# Patient Record
Sex: FEMALE | Race: OTHER | NOT HISPANIC OR LATINO | URBAN - METROPOLITAN AREA
[De-identification: names, ages, dates, MRNs, and addresses within clinical notes are randomized per-mention and may not be internally consistent; named-entity substitution may affect disease eponyms.]

---

## 2017-05-28 ENCOUNTER — EMERGENCY (EMERGENCY)
Facility: HOSPITAL | Age: 52
LOS: 1 days | Discharge: HOME | End: 2017-05-28
Attending: EMERGENCY MEDICINE | Admitting: EMERGENCY MEDICINE
Payer: COMMERCIAL

## 2017-05-28 VITALS
DIASTOLIC BLOOD PRESSURE: 83 MMHG | TEMPERATURE: 99 F | RESPIRATION RATE: 18 BRPM | OXYGEN SATURATION: 97 % | SYSTOLIC BLOOD PRESSURE: 130 MMHG | HEART RATE: 80 BPM

## 2017-05-28 DIAGNOSIS — L57.8 OTHER SKIN CHANGES DUE TO CHRONIC EXPOSURE TO NONIONIZING RADIATION: ICD-10-CM

## 2017-05-28 PROCEDURE — 99283 EMERGENCY DEPT VISIT LOW MDM: CPT | Mod: 25

## 2017-05-28 PROCEDURE — 99282 EMERGENCY DEPT VISIT SF MDM: CPT

## 2017-05-28 NOTE — ED PROVIDER NOTE - MEDICAL DECISION MAKING DETAILS
Attending MD Mancilla: 53 yo female with PMH for HTN, presents with bilateral eye irritation and redness.  Recent contact with person with adenovirus.  On exam there is bilateral conjunctival irritation. Vision intact. No discharge.  Doubt bacterial conjunctivitis.  Likely viral.   Plan artificial tears and outpatient opthalmology eval.

## 2017-05-28 NOTE — ED PROVIDER NOTE - CARE PLAN
Principal Discharge DX:	Epidemic keratoconjunctivitis  Instructions for follow-up, activity and diet:	1. Continue the antibiotic eye drops as previously prescribed. Use artificial tears as needed to keep the eyes hydrated.   2. Follow up in the Ophthalmology Clinic as soon as possible for further evaluation. Phone number provided.  3. Return to the Emergency Department for any concerning symptoms.

## 2017-05-28 NOTE — ED PROVIDER NOTE - ATTENDING CONTRIBUTION TO CARE
Attending MD Mancilla:   I personally have seen and examined this patient.  Physician assistant note reviewed and agree on plan of care and except where noted.  See MDM for details.

## 2017-05-28 NOTE — ED PROVIDER NOTE - OBJECTIVE STATEMENT
52 y.o. female hx of HTN p/w eye irritation and redness. She states that symptoms appeared on Thursday, with mild eye L itchiness/irritation and when she woke up on Friday it was occurring in both eyes. She also reports discharge (clear) that adheres the eyelids together in the mornings. She was seen at an Urgent Care facility on Friday and was given Ocuflox drops which she has been taking but symptoms did not improve. She was again seen in the urgent care and reportedly had a fever of 100.8 so she was sent in for further evaluation. Denies vision changes, weakness, eye pain, headache.

## 2017-05-28 NOTE — ED PROVIDER NOTE - PLAN OF CARE
1. Continue the antibiotic eye drops as previously prescribed. Use artificial tears as needed to keep the eyes hydrated.   2. Follow up in the Ophthalmology Clinic as soon as possible for further evaluation. Phone number provided.  3. Return to the Emergency Department for any concerning symptoms.

## 2017-05-28 NOTE — ED PROVIDER NOTE - PROGRESS NOTE DETAILS
Spoke with ophthalmology resident, states that there is no further treatment required given the patients history of adenovirus exposure. Will encourage hand hygiene and artificial tears and follow up in the Ophtho clinic this coming week. Diaz Sanchez PA-C.

## 2017-05-28 NOTE — ED ADULT NURSE NOTE - OBJECTIVE STATEMENT
Pt sent from urgent care for eval of bilateral clear eye drainage and injection of sclera.  She reports crusting on her lashes when she wakes up.  Denies contact lens use.  Her daughter was recently dx'd with adenovirus.

## 2017-05-31 ENCOUNTER — APPOINTMENT (OUTPATIENT)
Dept: OPHTHALMOLOGY | Facility: CLINIC | Age: 52
End: 2017-05-31

## 2017-07-14 ENCOUNTER — INPATIENT (INPATIENT)
Facility: HOSPITAL | Age: 52
LOS: 0 days | Discharge: HOME | DRG: 379 | End: 2017-07-15
Attending: INTERNAL MEDICINE | Admitting: INTERNAL MEDICINE
Payer: COMMERCIAL

## 2017-07-14 VITALS
SYSTOLIC BLOOD PRESSURE: 104 MMHG | DIASTOLIC BLOOD PRESSURE: 82 MMHG | HEART RATE: 82 BPM | RESPIRATION RATE: 20 BRPM | OXYGEN SATURATION: 97 % | TEMPERATURE: 98 F

## 2017-07-14 DIAGNOSIS — R63.8 OTHER SYMPTOMS AND SIGNS CONCERNING FOOD AND FLUID INTAKE: ICD-10-CM

## 2017-07-14 DIAGNOSIS — K92.0 HEMATEMESIS: ICD-10-CM

## 2017-07-14 DIAGNOSIS — K92.2 GASTROINTESTINAL HEMORRHAGE, UNSPECIFIED: ICD-10-CM

## 2017-07-14 DIAGNOSIS — Z29.9 ENCOUNTER FOR PROPHYLACTIC MEASURES, UNSPECIFIED: ICD-10-CM

## 2017-07-14 DIAGNOSIS — I10 ESSENTIAL (PRIMARY) HYPERTENSION: ICD-10-CM

## 2017-07-14 LAB
ALBUMIN SERPL ELPH-MCNC: 4.6 G/DL — SIGNIFICANT CHANGE UP (ref 3.3–5)
ALP SERPL-CCNC: 66 U/L — SIGNIFICANT CHANGE UP (ref 40–120)
ALT FLD-CCNC: 22 U/L RC — SIGNIFICANT CHANGE UP (ref 10–45)
ANION GAP SERPL CALC-SCNC: 14 MMOL/L — SIGNIFICANT CHANGE UP (ref 5–17)
ANION GAP SERPL CALC-SCNC: 15 MMOL/L — SIGNIFICANT CHANGE UP (ref 5–17)
APTT BLD: 27.9 SEC — SIGNIFICANT CHANGE UP (ref 27.5–37.4)
AST SERPL-CCNC: 27 U/L — SIGNIFICANT CHANGE UP (ref 10–40)
BASOPHILS # BLD AUTO: 0 K/UL — SIGNIFICANT CHANGE UP (ref 0–0.2)
BASOPHILS NFR BLD AUTO: 0.1 % — SIGNIFICANT CHANGE UP (ref 0–2)
BILIRUB SERPL-MCNC: 0.3 MG/DL — SIGNIFICANT CHANGE UP (ref 0.2–1.2)
BLD GP AB SCN SERPL QL: NEGATIVE — SIGNIFICANT CHANGE UP
BUN SERPL-MCNC: 10 MG/DL — SIGNIFICANT CHANGE UP (ref 7–23)
BUN SERPL-MCNC: 16 MG/DL — SIGNIFICANT CHANGE UP (ref 7–23)
CALCIUM SERPL-MCNC: 8.7 MG/DL — SIGNIFICANT CHANGE UP (ref 8.4–10.5)
CALCIUM SERPL-MCNC: 9.7 MG/DL — SIGNIFICANT CHANGE UP (ref 8.4–10.5)
CHLORIDE SERPL-SCNC: 108 MMOL/L — SIGNIFICANT CHANGE UP (ref 96–108)
CHLORIDE SERPL-SCNC: 99 MMOL/L — SIGNIFICANT CHANGE UP (ref 96–108)
CO2 SERPL-SCNC: 19 MMOL/L — LOW (ref 22–31)
CO2 SERPL-SCNC: 24 MMOL/L — SIGNIFICANT CHANGE UP (ref 22–31)
CREAT SERPL-MCNC: 0.65 MG/DL — SIGNIFICANT CHANGE UP (ref 0.5–1.3)
CREAT SERPL-MCNC: 0.73 MG/DL — SIGNIFICANT CHANGE UP (ref 0.5–1.3)
EOSINOPHIL # BLD AUTO: 0 K/UL — SIGNIFICANT CHANGE UP (ref 0–0.5)
EOSINOPHIL NFR BLD AUTO: 0.1 % — SIGNIFICANT CHANGE UP (ref 0–6)
GAS PNL BLDV: SIGNIFICANT CHANGE UP
GLUCOSE SERPL-MCNC: 151 MG/DL — HIGH (ref 70–99)
GLUCOSE SERPL-MCNC: 98 MG/DL — SIGNIFICANT CHANGE UP (ref 70–99)
HCT VFR BLD CALC: 35.5 % — SIGNIFICANT CHANGE UP (ref 34.5–45)
HCT VFR BLD CALC: 39.2 % — SIGNIFICANT CHANGE UP (ref 34.5–45)
HGB BLD-MCNC: 12.2 G/DL — SIGNIFICANT CHANGE UP (ref 11.5–15.5)
HGB BLD-MCNC: 13.8 G/DL — SIGNIFICANT CHANGE UP (ref 11.5–15.5)
INR BLD: 1.11 RATIO — SIGNIFICANT CHANGE UP (ref 0.88–1.16)
LIDOCAIN IGE QN: 29 U/L — SIGNIFICANT CHANGE UP (ref 7–60)
LYMPHOCYTES # BLD AUTO: 1.2 K/UL — SIGNIFICANT CHANGE UP (ref 1–3.3)
LYMPHOCYTES # BLD AUTO: 7.8 % — LOW (ref 13–44)
MCHC RBC-ENTMCNC: 30.8 PG — SIGNIFICANT CHANGE UP (ref 27–34)
MCHC RBC-ENTMCNC: 32.4 PG — SIGNIFICANT CHANGE UP (ref 27–34)
MCHC RBC-ENTMCNC: 34.4 GM/DL — SIGNIFICANT CHANGE UP (ref 32–36)
MCHC RBC-ENTMCNC: 35.2 GM/DL — SIGNIFICANT CHANGE UP (ref 32–36)
MCV RBC AUTO: 89.6 FL — SIGNIFICANT CHANGE UP (ref 80–100)
MCV RBC AUTO: 92 FL — SIGNIFICANT CHANGE UP (ref 80–100)
MONOCYTES # BLD AUTO: 0.2 K/UL — SIGNIFICANT CHANGE UP (ref 0–0.9)
MONOCYTES NFR BLD AUTO: 1.4 % — LOW (ref 2–14)
NEUTROPHILS # BLD AUTO: 13.8 K/UL — HIGH (ref 1.8–7.4)
NEUTROPHILS NFR BLD AUTO: 90.6 % — HIGH (ref 43–77)
PLATELET # BLD AUTO: 197 K/UL — SIGNIFICANT CHANGE UP (ref 150–400)
PLATELET # BLD AUTO: 201 K/UL — SIGNIFICANT CHANGE UP (ref 150–400)
POTASSIUM SERPL-MCNC: 3.9 MMOL/L — SIGNIFICANT CHANGE UP (ref 3.5–5.3)
POTASSIUM SERPL-MCNC: 3.9 MMOL/L — SIGNIFICANT CHANGE UP (ref 3.5–5.3)
POTASSIUM SERPL-SCNC: 3.9 MMOL/L — SIGNIFICANT CHANGE UP (ref 3.5–5.3)
POTASSIUM SERPL-SCNC: 3.9 MMOL/L — SIGNIFICANT CHANGE UP (ref 3.5–5.3)
PROT SERPL-MCNC: 8.2 G/DL — SIGNIFICANT CHANGE UP (ref 6–8.3)
PROTHROM AB SERPL-ACNC: 12.1 SEC — SIGNIFICANT CHANGE UP (ref 9.8–12.7)
RBC # BLD: 3.96 M/UL — SIGNIFICANT CHANGE UP (ref 3.8–5.2)
RBC # BLD: 4.26 M/UL — SIGNIFICANT CHANGE UP (ref 3.8–5.2)
RBC # FLD: 11.8 % — SIGNIFICANT CHANGE UP (ref 10.3–14.5)
RBC # FLD: 12.9 % — SIGNIFICANT CHANGE UP (ref 10.3–14.5)
RH IG SCN BLD-IMP: POSITIVE — SIGNIFICANT CHANGE UP
RH IG SCN BLD-IMP: POSITIVE — SIGNIFICANT CHANGE UP
SODIUM SERPL-SCNC: 138 MMOL/L — SIGNIFICANT CHANGE UP (ref 135–145)
SODIUM SERPL-SCNC: 141 MMOL/L — SIGNIFICANT CHANGE UP (ref 135–145)
WBC # BLD: 15.3 K/UL — HIGH (ref 3.8–10.5)
WBC # BLD: 9.71 K/UL — SIGNIFICANT CHANGE UP (ref 3.8–10.5)
WBC # FLD AUTO: 15.3 K/UL — HIGH (ref 3.8–10.5)
WBC # FLD AUTO: 9.71 K/UL — SIGNIFICANT CHANGE UP (ref 3.8–10.5)

## 2017-07-14 PROCEDURE — 99285 EMERGENCY DEPT VISIT HI MDM: CPT | Mod: 25

## 2017-07-14 PROCEDURE — 99223 1ST HOSP IP/OBS HIGH 75: CPT

## 2017-07-14 PROCEDURE — 71010: CPT | Mod: 26

## 2017-07-14 RX ORDER — ACETAMINOPHEN 500 MG
650 TABLET ORAL ONCE
Qty: 0 | Refills: 0 | Status: COMPLETED | OUTPATIENT
Start: 2017-07-14 | End: 2017-07-14

## 2017-07-14 RX ORDER — METOPROLOL TARTRATE 50 MG
0 TABLET ORAL
Qty: 0 | Refills: 0 | COMMUNITY

## 2017-07-14 RX ORDER — PANTOPRAZOLE SODIUM 20 MG/1
40 TABLET, DELAYED RELEASE ORAL DAILY
Qty: 0 | Refills: 0 | Status: DISCONTINUED | OUTPATIENT
Start: 2017-07-14 | End: 2017-07-15

## 2017-07-14 RX ORDER — PANTOPRAZOLE SODIUM 20 MG/1
8 TABLET, DELAYED RELEASE ORAL
Qty: 80 | Refills: 0 | Status: DISCONTINUED | OUTPATIENT
Start: 2017-07-14 | End: 2017-07-14

## 2017-07-14 RX ORDER — SODIUM CHLORIDE 9 MG/ML
2000 INJECTION INTRAMUSCULAR; INTRAVENOUS; SUBCUTANEOUS ONCE
Qty: 0 | Refills: 0 | Status: COMPLETED | OUTPATIENT
Start: 2017-07-14 | End: 2017-07-14

## 2017-07-14 RX ADMIN — PANTOPRAZOLE SODIUM 10 MG/HR: 20 TABLET, DELAYED RELEASE ORAL at 03:13

## 2017-07-14 RX ADMIN — Medication 650 MILLIGRAM(S): at 23:13

## 2017-07-14 RX ADMIN — SODIUM CHLORIDE 2000 MILLILITER(S): 9 INJECTION INTRAMUSCULAR; INTRAVENOUS; SUBCUTANEOUS at 02:47

## 2017-07-14 RX ADMIN — PANTOPRAZOLE SODIUM 10 MG/HR: 20 TABLET, DELAYED RELEASE ORAL at 14:15

## 2017-07-14 RX ADMIN — Medication 650 MILLIGRAM(S): at 23:45

## 2017-07-14 RX ADMIN — PANTOPRAZOLE SODIUM 40 MILLIGRAM(S): 20 TABLET, DELAYED RELEASE ORAL at 18:06

## 2017-07-14 NOTE — ED ADULT NURSE NOTE - CHPI ED SYMPTOMS NEG
no hematuria/no blood in stool/no burning urination/no chills/no abdominal distension/no fever/no diarrhea

## 2017-07-14 NOTE — CONSULT NOTE ADULT - SUBJECTIVE AND OBJECTIVE BOX
Patient is a 52y old  Female who presents with a chief complaint of vomiting (14 Jul 2017 10:31)      HPI:  53 yo female with pmhx of HTN presents with a 1 day history of acute vomiting.  Pt states last night at around 830pm she began to have multiple episodes of dark red vomiting, reporting >10 episodes.  She states that she had some watermelon earlier in the evening and berries earlier that day, and did not eat anything different than her usual routine.  She denied any diarrhea, fevers, chills, abdominal pain.  Pt denies any history of NSAID use, or hx of PUD.  She did not feel dizzy during these episodes, and states that the last time she vomited was around 2am.  Currently she feels well without any nausea. She denies melena, diarrhea, blood per rectum. She denies sick contacts or exposure to raw meat. No recent antibiotics. denies any recent nausea or abdominal pain. no fam history og gi cancer or biliary disease    In the ED the pt received 2L of NS, and was started on PPI gtt. (14 Jul 2017 10:31)      PAST MEDICAL & SURGICAL HISTORY:  Hypertension  No significant past surgical history      MEDICATIONS  (STANDING):  pantoprazole Infusion 8 mG/Hr (10 mL/Hr) IV Continuous <Continuous>    MEDICATIONS  (PRN):      Allergies    No Known Allergies    Intolerances        Review of Systems:    General:  No wt loss, fevers, chills, night sweats,fatigue,   CV:  No pain, palpitatioins, hypo/hypertension  Resp:  No dyspnea, cough, tachypnea, wheezing  GI:  No pain,  vomiting, No diarrhea, No constipatiion, No weight loss, No fever, No pruritis, No rectal bleeding, No tarry stools, No dysphagia,  :  No pain, bleeding, incontinence, nocturia  Muscle:  No pain, weakness  Neuro:  No weakness, tingling, memory problems  Psych:  No fatigue, insomnia, mood problems, depression  Endocrine:  No polyuria, polydypsia, cold/heat intolerance  Heme:  No petechiae, ecchymosis, easy bruisability  Skin:  No rash, tattoos, scars, edema      Vital Signs Last 24 Hrs  T(C): 36.8 (14 Jul 2017 15:42), Max: 36.9 (14 Jul 2017 00:27)  T(F): 98.2 (14 Jul 2017 15:42), Max: 98.5 (14 Jul 2017 00:27)  HR: 74 (14 Jul 2017 15:42) (74 - 85)  BP: 120/73 (14 Jul 2017 15:42) (104/82 - 120/79)  BP(mean): --  RR: 18 (14 Jul 2017 15:42) (17 - 20)  SpO2: 98% (14 Jul 2017 15:42) (97% - 100%)    PHYSICAL EXAM:    Constitutional: NAD, well-developed  Neck: No LAD, supple  Respiratory: CTA and P  Cardiovascular: S1 and S2, RRR, no M  Gastrointestinal: BS+, soft, NT/ND, neg HSM,  Extremities: No peripheral edema, neg clubing, cyanosis  Vascular: 2+ peripheral pulses  Neurological: A/O x 3, no focal deficits  Psychiatric: Normal mood, normal affect  Skin: No rashes        LABS:                        12.2   9.71  )-----------( 197      ( 14 Jul 2017 12:41 )             35.5     07-14    141  |  108  |  10  ----------------------------<  98  3.9   |  19<L>  |  0.65    Ca    8.7      14 Jul 2017 12:41    TPro  8.2  /  Alb  4.6  /  TBili  0.3  /  DBili  x   /  AST  27  /  ALT  22  /  AlkPhos  66  07-14    PT/INR - ( 14 Jul 2017 02:54 )   PT: 12.1 sec;   INR: 1.11 ratio         PTT - ( 14 Jul 2017 02:54 )  PTT:27.9 sec    LIVER FUNCTIONS - ( 14 Jul 2017 02:36 )  Alb: 4.6 g/dL / Pro: 8.2 g/dL / ALK PHOS: 66 U/L / ALT: 22 U/L RC / AST: 27 U/L / GGT: x           Hemoglobin: 12.2 g/dL (07-14-17 @ 12:41)  Hemoglobin: 13.8 g/dL (07-14-17 @ 02:36)    RADIOLOGY & ADDITIONAL TESTS:

## 2017-07-14 NOTE — ED PROVIDER NOTE - PHYSICAL EXAMINATION
Johanna Moyer M.D.:   patient awake alert seen lying on stretcher NAD .   LUNGS CTAB no wheeze no crackle.   CARD RRR no m/r/g.    Abdomen soft NT ND no rebound no guarding no CVA tenderness.   EXT WWP no edema no calf tenderness CV 2+DP/PT bilaterally.   neuro A&Ox3 gait normal.    skin warm and dry no rash  HEENT: dry mucous membranes, PERRL, EOMI

## 2017-07-14 NOTE — H&P ADULT - NSHPREVIEWOFSYSTEMS_GEN_ALL_CORE
Review of Systems:  Constitutional: no malaise/fatigue/fevers  HEENT: no headache/sore throat/rhinorrhea  Respiratory: no cough/SOB/chest pain  Cardiac: no palpitations/chest pain  Vascular: no leg swelling  Gastrointestinal: no nausea/vomiting/diarrhea/constipation  Genitourinary: no dysuria/nocturia/polyuria  MSK: no joint or muscle pain  Skin: no rashes  Neuro: no headaches/focal weakness/numbness

## 2017-07-14 NOTE — ED PROVIDER NOTE - OBJECTIVE STATEMENT
Johanna Moyer M.D: 53yoF started feeling nauseas after dinner has had multiple episodes of NBNB vomit. currently denies nausea, abdominal pain, diarrhea. no fevers/chills.   pmh: HTN  PMD:Sarthak Johanna Moyer M.D: 53yoF started feeling nauseas after dinner has had multiple episodes of ?bloody vomit. currently denies nausea, abdominal pain, diarrhea. no fevers/chills.   pmh: HTN  PMD:Sarthak

## 2017-07-14 NOTE — H&P ADULT - NSHPPHYSICALEXAM_GEN_ALL_CORE
VITALS  Vital Signs Last 24 Hrs  T(C): 36.8 (14 Jul 2017 07:03), Max: 36.9 (14 Jul 2017 00:27)  T(F): 98.3 (14 Jul 2017 07:03), Max: 98.5 (14 Jul 2017 00:27)  HR: 74 (14 Jul 2017 07:03) (74 - 85)  BP: 117/82 (14 Jul 2017 07:03) (104/82 - 120/79)  BP(mean): --  RR: 18 (14 Jul 2017 07:03) (17 - 20)  SpO2: 100% (14 Jul 2017 07:03) (97% - 100%)    I&O's Summary      CAPILLARY BLOOD GLUCOSE          PHYSICAL EXAM  General: A&Ox3; NAD  Head: NC/AT;   Eyes: PERRL; EOMI; no conjunctival pallor  Neck: Supple; no JVD  Respiratory: CTA B/L; no wheezes/crackles/rales auscultated w/ good air movement  Cardiovascular: Regular rhythm/rate; S1/S2  Gastrointestinal: Soft; NTND w/out rebound tenderness or guarding; bowel sounds normal  Extremities: WWP; no edema or cyanosis  Neurological:  CNII-XII grossly intact; no obvious focal deficits

## 2017-07-14 NOTE — ED PROVIDER NOTE - MEDICAL DECISION MAKING DETAILS
Johanna Moyer M.D: 52yoF p/w sudden onset bloody vomiting. hemodynamically stable. no known hx PUD. hb stbale. PPI drip, admission for scope. Johanna Moyer M.D: 52yoF p/w sudden onset bloody vomiting. hemodynamically stable. no known hx PUD. hb stbale. PPI drip, admission for scope.  Att yo female presents with >10 episodes of hematemesis; no abdominal pain; no fevers; no changes in bm nor diarrhea; no anticoagulants; on exam nad, lungs cta nontender abdomen + guiac bloody vomiting; brown stool guiac negative; admit for gi bleed; pt hemodynamically stable at this time; Plan: labs, type, cxr, protonix, gi, admission

## 2017-07-14 NOTE — ED ADULT NURSE NOTE - OBJECTIVE STATEMENT
pt c/o "sudden onset of nausea and vomiting that started last night approx. at 2030. my vomit was dark red in color but I had eaten watermelon and berries earlier today." pt denies any abd. pain, chest pain, or SOB

## 2017-07-14 NOTE — H&P ADULT - HISTORY OF PRESENT ILLNESS
51 yo female with pmhx of HTN presents with a 1 day history of acute vomiting.  Pt states last night at around 830pm she began to have multiple episodes of dark vomiting, reporting >10 episodes.  She states that she had some watermelon earlier in the evening and berries earlier that day, and did not eat anything different than her usual routine.  She denied any diarrhea, fevers, chills, abdominal pain.  Pt denies any history of NSAID use, or hx of PUD.  She did not feel dizzy during these episodes, and states that the last time she vomited was around 2am.  Currently she feels well without any nausea.    In the ED the pt received 2L of NS, and was started on PPI gtt.

## 2017-07-14 NOTE — H&P ADULT - NSHPLABSRESULTS_GEN_ALL_CORE
.  LABS:                         13.8   15.3  )-----------( 201      ( 14 Jul 2017 02:36 )             39.2     07-14    138  |  99  |  16  ----------------------------<  151<H>  3.9   |  24  |  0.73    Ca    9.7      14 Jul 2017 02:36    TPro  8.2  /  Alb  4.6  /  TBili  0.3  /  DBili  x   /  AST  27  /  ALT  22  /  AlkPhos  66  07-14    PT/INR - ( 14 Jul 2017 02:54 )   PT: 12.1 sec;   INR: 1.11 ratio         PTT - ( 14 Jul 2017 02:54 )  PTT:27.9 sec    CXR: Personally reviewed by me, clear lungs  EKG: NSR with LAD

## 2017-07-14 NOTE — H&P ADULT - PROBLEM SELECTOR PLAN 1
- Pt with +guaiac vomit in the ER, however currently no further episodes.  Etiology unclear, maybe 2/2 viral etiology given acute onset within eating.  Unclear whether this was hematemesis vs blood tinged vomiting 2/2 multilple episodes of vomiting which may have led to irritation the lining, and therefore gave the appearance of hematemesis.  Pt without risk factors for UGIB, currently HD stable.  - Repeat CBC to make sure Hgb stable  - Active T&S  - NPO, c/w PPI gtt   - GI to be consulted for possible endoscopy

## 2017-07-14 NOTE — CONSULT NOTE ADULT - ASSESSMENT
52 year old woman with recent significant vomiting and question of GI bleed.   low suspicion for hematemesis given voeral stability of patient. likely viral gastroenteritis and suspect contents mostly reflect fruits she ate and possible tania horton or gastritis from numerous epiosdes of vomiting. less likely biliary colic  currently patient feels well  clear liquid diet tonight. if tolerates diet advance to soft diet tomorrow.   q12 cbc  if hgb stable and tolerating diet should f/u as opt for opt egd and colonoscopy and possible RUQ US  patient understands and agrees with plan.   call with questions  [phone]

## 2017-07-15 ENCOUNTER — TRANSCRIPTION ENCOUNTER (OUTPATIENT)
Age: 52
End: 2017-07-15

## 2017-07-15 VITALS
SYSTOLIC BLOOD PRESSURE: 119 MMHG | DIASTOLIC BLOOD PRESSURE: 73 MMHG | OXYGEN SATURATION: 99 % | TEMPERATURE: 99 F | HEART RATE: 67 BPM | RESPIRATION RATE: 17 BRPM

## 2017-07-15 PROCEDURE — 82803 BLOOD GASES ANY COMBINATION: CPT

## 2017-07-15 PROCEDURE — 80053 COMPREHEN METABOLIC PANEL: CPT

## 2017-07-15 PROCEDURE — 84295 ASSAY OF SERUM SODIUM: CPT

## 2017-07-15 PROCEDURE — 85027 COMPLETE CBC AUTOMATED: CPT

## 2017-07-15 PROCEDURE — 84132 ASSAY OF SERUM POTASSIUM: CPT

## 2017-07-15 PROCEDURE — 96374 THER/PROPH/DIAG INJ IV PUSH: CPT

## 2017-07-15 PROCEDURE — 86850 RBC ANTIBODY SCREEN: CPT

## 2017-07-15 PROCEDURE — 85730 THROMBOPLASTIN TIME PARTIAL: CPT

## 2017-07-15 PROCEDURE — 83605 ASSAY OF LACTIC ACID: CPT

## 2017-07-15 PROCEDURE — 85610 PROTHROMBIN TIME: CPT

## 2017-07-15 PROCEDURE — 86901 BLOOD TYPING SEROLOGIC RH(D): CPT

## 2017-07-15 PROCEDURE — 82947 ASSAY GLUCOSE BLOOD QUANT: CPT

## 2017-07-15 PROCEDURE — 85014 HEMATOCRIT: CPT

## 2017-07-15 PROCEDURE — 99285 EMERGENCY DEPT VISIT HI MDM: CPT | Mod: 25

## 2017-07-15 PROCEDURE — 83690 ASSAY OF LIPASE: CPT

## 2017-07-15 PROCEDURE — 82330 ASSAY OF CALCIUM: CPT

## 2017-07-15 PROCEDURE — 82435 ASSAY OF BLOOD CHLORIDE: CPT

## 2017-07-15 PROCEDURE — 71045 X-RAY EXAM CHEST 1 VIEW: CPT

## 2017-07-15 PROCEDURE — 82272 OCCULT BLD FECES 1-3 TESTS: CPT

## 2017-07-15 PROCEDURE — 86900 BLOOD TYPING SEROLOGIC ABO: CPT

## 2017-07-15 PROCEDURE — 93005 ELECTROCARDIOGRAM TRACING: CPT | Mod: 76

## 2017-07-15 PROCEDURE — G0378: CPT

## 2017-07-15 PROCEDURE — 80048 BASIC METABOLIC PNL TOTAL CA: CPT

## 2017-07-15 RX ORDER — PANTOPRAZOLE SODIUM 20 MG/1
40 TABLET, DELAYED RELEASE ORAL
Qty: 0 | Refills: 0 | Status: DISCONTINUED | OUTPATIENT
Start: 2017-07-15 | End: 2017-07-15

## 2017-07-15 RX ORDER — METOPROLOL TARTRATE 50 MG
1 TABLET ORAL
Qty: 0 | Refills: 0 | COMMUNITY

## 2017-07-15 RX ORDER — PANTOPRAZOLE SODIUM 20 MG/1
1 TABLET, DELAYED RELEASE ORAL
Qty: 0 | Refills: 0 | COMMUNITY
Start: 2017-07-15

## 2017-07-15 NOTE — DISCHARGE NOTE ADULT - CARE PROVIDERS DIRECT ADDRESSES
[email or web address]

## 2017-07-15 NOTE — DISCHARGE NOTE ADULT - CARE PROVIDER_API CALL
Cristy De León), Internal Medicine  [address] [place] [address]  Phone: [phone]  Fax: [phone]    Kareem Hendrix), Internal Medicine  [address] [place] [address]  Phone: [phone]  Fax: [phone]

## 2017-07-15 NOTE — DISCHARGE NOTE ADULT - CARE PLAN
Principal Discharge DX:	Hematemesis  Goal:	Resolved  Instructions for follow-up, activity and diet:	Continue with Protonix as prescribed.  Follow-up with your primary care physician and gastroenterologist in 1 to 2 weeks.  Secondary Diagnosis:	Hypertension  Instructions for follow-up, activity and diet:	Follow-up with your primary care physician regarding continuing your Metoprolol which has been held due to risk of developing low blood pressure due to possible bleed.   Low salt diet  Activity as tolerated.  Take all medication as prescribed.  Follow up with your medical doctor for routine blood pressure monitoring at your next visit.  Notify your doctor if you have any of the following symptoms:   Dizziness, Lightheadedness, Blurry vision, Headache, Chest pain, Shortness of breath

## 2017-07-15 NOTE — DISCHARGE NOTE ADULT - HOSPITAL COURSE
To be completed by attending. 53 yo female with hx of HTN presents with multiple episodes of vomiting     Problem/Plan - 1:  ·  Problem: Hematemesis.  Plan: vomiting resolved, no BRBPR, no melena, on IV PPI  - Repeat CBC is STABLE  GI consult reviewed. if no further symptoms, will advance diet as per GI, if tolerates, DC home w outptn F/U.      Problem/Plan - 2:  ·  Problem: Hypertension.  Plan: - On Toprol XL at home, currently holding in the setting of hematemesis.      Problem/Plan - 3:  ·  Problem: Need for prophylactic measure.  Plan: - No HSQ given bloody vomit, SCDs only.      Problem/Plan - 4:  ·  Problem: Nutrition, metabolism, and development symptoms.  Plan: - NPO for now pending possible endoscopic intervention.

## 2017-07-15 NOTE — DISCHARGE NOTE ADULT - MEDICATION SUMMARY - MEDICATIONS TO STOP TAKING
I will STOP taking the medications listed below when I get home from the hospital:    Toprol-XL  --  by mouth    Toprol-XL 50 mg oral tablet, extended release  -- 1 tab(s) by mouth once a day

## 2017-07-15 NOTE — PROGRESS NOTE ADULT - PROBLEM SELECTOR PLAN 1
vomiting resolved, no BRBPR, no melena, on IV PPI  - Repeat CBC is STABLE  GI consult reviewed. if no further symptoms, will advance diet as per GI, if tolerates, DC home w outptn F/U

## 2017-07-15 NOTE — DISCHARGE NOTE ADULT - PLAN OF CARE
Resolved Continue with Protonix as prescribed.  Follow-up with your primary care physician and gastroenterologist in 1 to 2 weeks. Follow-up with your primary care physician regarding continuing your Metoprolol which has been held due to risk of developing low blood pressure due to possible bleed.   Low salt diet  Activity as tolerated.  Take all medication as prescribed.  Follow up with your medical doctor for routine blood pressure monitoring at your next visit.  Notify your doctor if you have any of the following symptoms:   Dizziness, Lightheadedness, Blurry vision, Headache, Chest pain, Shortness of breath

## 2017-07-15 NOTE — PROGRESS NOTE ADULT - SUBJECTIVE AND OBJECTIVE BOX
INTERVAL HPI/OVERNIGHT EVENTS:    SUBJECTIVE: The patient has no new complaints. Denies nausea, vomiting, diarrhea. Tolerated regular diet for breakfast. She is eager to go home today.     MEDICATIONS  (STANDING):  pantoprazole    Tablet 40 milliGRAM(s) Oral before breakfast      OBJECTIVE:  Vital Signs Last 24 Hrs  T(C): 37.1 (15 Jul 2017 04:20), Max: 37.1 (14 Jul 2017 19:23)  T(F): 98.8 (15 Jul 2017 04:20), Max: 98.8 (15 Jul 2017 04:20)  HR: 67 (15 Jul 2017 04:20) (67 - 80)  BP: 119/73 (15 Jul 2017 04:20) (119/73 - 138/81)  BP(mean): --  RR: 17 (15 Jul 2017 04:20) (17 - 18)  SpO2: 99% (15 Jul 2017 04:20) (96% - 99%)    PHYSICAL EXAM:  Constitutional: NAD, well-developed  Respiratory: CTA and P  Cardiovascular: S1 and S2, RRR, no M  Gastrointestinal: BS+, soft, NT/ND, neg HSM,  Extremities: No peripheral edema, neg clubing, cyanosis    LABS:                        12.2   9.71  )-----------( 197      ( 14 Jul 2017 12:41 )             35.5     07-14    141  |  108  |  10  ----------------------------<  98  3.9   |  19<L>  |  0.65    Ca    8.7      14 Jul 2017 12:41    TPro  8.2  /  Alb  4.6  /  TBili  0.3  /  DBili  x   /  AST  27  /  ALT  22  /  AlkPhos  66  07-14    PT/INR - ( 14 Jul 2017 02:54 )   PT: 12.1 sec;   INR: 1.11 ratio         PTT - ( 14 Jul 2017 02:54 )  PTT:27.9 sec    LIVER FUNCTIONS - ( 14 Jul 2017 02:36 )  Alb: 4.6 g/dL / Pro: 8.2 g/dL / ALK PHOS: 66 U/L / ALT: 22 U/L RC / AST: 27 U/L / GGT: x             RADIOLOGY & ADDITIONAL TESTS:  CXR: No cardiopulmonary pathology

## 2017-07-15 NOTE — PROGRESS NOTE ADULT - ASSESSMENT
52 year old healthy woman who presents with acute nausea and vomiting. Patient possibly had 1 episode of coffee ground emesis at home. Likely a self-limited viral gastroenteritis. Also may have had a mild Alise Sena tear from retching. Symptoms have resolved and she is tolerating regular diet.      - No objections to discharging patient home today from GI standpoint.   - PPI once daily for 1 week. Can use OTC omeprazole.  - Patient should follow-up with Dr. Hendrix in 2-4 weeks to schedule screening colonoscopy.

## 2017-07-15 NOTE — PROGRESS NOTE ADULT - SUBJECTIVE AND OBJECTIVE BOX
Patient is a 52y old  Female who presents with a chief complaint of vomiting (14 Jul 2017 10:31)      SUBJECTIVE / OVERNIGHT EVENTS:     MEDICATIONS  (STANDING):  pantoprazole  Injectable 40 milliGRAM(s) IV Push daily    MEDICATIONS  (PRN):      Vital Signs Last 24 Hrs  T(F): 98.8 (07-15-17 @ 04:20), Max: 98.8 (07-15-17 @ 04:20)  HR: 67 (07-15-17 @ 04:20) (67 - 80)  BP: 119/73 (07-15-17 @ 04:20) (119/73 - 138/81)  RR: 17 (07-15-17 @ 04:20) (17 - 18)  SpO2: 99% (07-15-17 @ 04:20) (96% - 99%)  Telemetry:   CAPILLARY BLOOD GLUCOSE        I&O's Summary      PHYSICAL EXAM:  GENERAL: NAD, well-developed  HEAD:  Atraumatic, Normocephalic  EYES: EOMI, PERRLA, conjunctiva and sclera clear  NECK: Supple, No JVD  CHEST/LUNG: Clear to auscultation bilaterally; No wheeze  HEART: Regular rate and rhythm; No murmurs, rubs, or gallops  ABDOMEN: Soft, Nontender, Nondistended; Bowel sounds present  EXTREMITIES:  2+ Peripheral Pulses, No clubbing, cyanosis, or edema  PSYCH: AAOx3  NEUROLOGY: non-focal  SKIN: No rashes or lesions    LABS:                        12.2   9.71  )-----------( 197      ( 14 Jul 2017 12:41 )             35.5     07-14    141  |  108  |  10  ----------------------------<  98  3.9   |  19<L>  |  0.65    Ca    8.7      14 Jul 2017 12:41    TPro  8.2  /  Alb  4.6  /  TBili  0.3  /  DBili  x   /  AST  27  /  ALT  22  /  AlkPhos  66  07-14    PT/INR - ( 14 Jul 2017 02:54 )   PT: 12.1 sec;   INR: 1.11 ratio         PTT - ( 14 Jul 2017 02:54 )  PTT:27.9 sec          RADIOLOGY & ADDITIONAL TESTS:    Imaging Personally Reviewed:    Consultant(s) Notes Reviewed:      Care Discussed with Consultants/Other Providers:

## 2017-10-19 ENCOUNTER — RESULT REVIEW (OUTPATIENT)
Age: 52
End: 2017-10-19

## 2017-12-21 ENCOUNTER — NON-APPOINTMENT (OUTPATIENT)
Age: 52
End: 2017-12-21

## 2017-12-21 ENCOUNTER — APPOINTMENT (OUTPATIENT)
Dept: CARDIOLOGY | Facility: CLINIC | Age: 52
End: 2017-12-21
Payer: COMMERCIAL

## 2017-12-21 VITALS
WEIGHT: 125 LBS | HEIGHT: 60 IN | HEART RATE: 63 BPM | SYSTOLIC BLOOD PRESSURE: 129 MMHG | BODY MASS INDEX: 24.54 KG/M2 | OXYGEN SATURATION: 98 % | DIASTOLIC BLOOD PRESSURE: 91 MMHG

## 2017-12-21 DIAGNOSIS — Z82.49 FAMILY HISTORY OF ISCHEMIC HEART DISEASE AND OTHER DISEASES OF THE CIRCULATORY SYSTEM: ICD-10-CM

## 2017-12-21 PROCEDURE — 99204 OFFICE O/P NEW MOD 45 MIN: CPT

## 2017-12-21 PROCEDURE — 93000 ELECTROCARDIOGRAM COMPLETE: CPT

## 2018-08-23 ENCOUNTER — RESULT REVIEW (OUTPATIENT)
Age: 53
End: 2018-08-23

## 2018-11-29 PROBLEM — I10 ESSENTIAL (PRIMARY) HYPERTENSION: Chronic | Status: ACTIVE | Noted: 2017-05-28

## 2018-12-19 ENCOUNTER — RESULT REVIEW (OUTPATIENT)
Age: 53
End: 2018-12-19

## 2019-01-17 ENCOUNTER — NON-APPOINTMENT (OUTPATIENT)
Age: 54
End: 2019-01-17

## 2019-01-17 ENCOUNTER — APPOINTMENT (OUTPATIENT)
Dept: CARDIOLOGY | Facility: CLINIC | Age: 54
End: 2019-01-17
Payer: COMMERCIAL

## 2019-01-17 VITALS
WEIGHT: 127 LBS | HEIGHT: 60 IN | BODY MASS INDEX: 24.94 KG/M2 | DIASTOLIC BLOOD PRESSURE: 83 MMHG | HEART RATE: 55 BPM | OXYGEN SATURATION: 100 % | SYSTOLIC BLOOD PRESSURE: 124 MMHG

## 2019-01-17 PROCEDURE — 93000 ELECTROCARDIOGRAM COMPLETE: CPT

## 2019-01-17 PROCEDURE — 99214 OFFICE O/P EST MOD 30 MIN: CPT

## 2019-01-20 NOTE — REASON FOR VISIT
[Hypertension] : hypertension [Medication Management] : Medication management [Follow-Up - Clinic] : a clinic follow-up of

## 2019-01-20 NOTE — HISTORY OF PRESENT ILLNESS
[FreeTextEntry1] : Yosef is returning for a f/u\par \par No symptoms\par No CP\par No side effect to meds\par

## 2019-05-23 ENCOUNTER — RESULT REVIEW (OUTPATIENT)
Age: 54
End: 2019-05-23

## 2019-10-04 ENCOUNTER — RESULT REVIEW (OUTPATIENT)
Age: 54
End: 2019-10-04

## 2020-01-30 ENCOUNTER — RX RENEWAL (OUTPATIENT)
Age: 55
End: 2020-01-30

## 2020-01-30 RX ORDER — METOPROLOL SUCCINATE 50 MG/1
50 TABLET, EXTENDED RELEASE ORAL DAILY
Qty: 90 | Refills: 3 | Status: ACTIVE | COMMUNITY
Start: 2020-01-30 | End: 1900-01-01

## 2020-03-04 ENCOUNTER — RESULT REVIEW (OUTPATIENT)
Age: 55
End: 2020-03-04

## 2020-11-05 ENCOUNTER — RESULT REVIEW (OUTPATIENT)
Age: 55
End: 2020-11-05

## 2020-12-07 ENCOUNTER — RESULT REVIEW (OUTPATIENT)
Age: 55
End: 2020-12-07

## 2021-05-12 NOTE — ED ADULT NURSE NOTE - EXTENSIONS OF SELF_ADULT
Patient Education        Sore Throat in Children: Care Instructions  Your Care Instructions     Infection by bacteria or a virus causes most sore throats. Cigarette smoke, dry air, air pollution, allergies, or yelling also can cause a sore throat. Sore throats can be painful and annoying. Fortunately, most sore throats go away on their own. Home treatment may help your child feel better sooner. Antibiotics are not needed unless your child has a strep infection. Follow-up care is a key part of your child's treatment and safety. Be sure to make and go to all appointments, and call your doctor if your child is having problems. It's also a good idea to know your child's test results and keep a list of the medicines your child takes. How can you care for your child at home? · If the doctor prescribed antibiotics for your child, give them as directed. Do not stop using them just because your child feels better. Your child needs to take the full course of antibiotics. · If your child is old enough to do so, have your child gargle with warm salt water at least once each hour to help reduce swelling and relieve discomfort. Use 1 teaspoon of salt mixed in 8 ounces of warm water. Most children can gargle when they are 10to 6years old. · Give acetaminophen (Tylenol) or ibuprofen (Advil, Motrin) for pain. Read and follow all instructions on the label. Do not give aspirin to anyone younger than 20. It has been linked to Reye syndrome, a serious illness. · Try an over-the-counter anesthetic throat spray or throat lozenges, which may help relieve throat pain. Do not give lozenges to children younger than age 3. If your child is younger than age 3, ask your doctor if you can give your child numbing medicines. · Have your child drink plenty of fluids. Drinks such as warm water or warm lemonade may ease throat pain. Frozen ice treats, ice cream, scrambled eggs, gelatin dessert, and sherbet can also soothe the throat.  If your None

## 2021-07-20 ENCOUNTER — APPOINTMENT (OUTPATIENT)
Dept: OTOLARYNGOLOGY | Facility: CLINIC | Age: 56
End: 2021-07-20
Payer: COMMERCIAL

## 2021-07-20 VITALS
HEART RATE: 50 BPM | SYSTOLIC BLOOD PRESSURE: 130 MMHG | HEIGHT: 60 IN | WEIGHT: 130 LBS | DIASTOLIC BLOOD PRESSURE: 86 MMHG | BODY MASS INDEX: 25.52 KG/M2

## 2021-07-20 DIAGNOSIS — Z86.79 PERSONAL HISTORY OF OTHER DISEASES OF THE CIRCULATORY SYSTEM: ICD-10-CM

## 2021-07-20 DIAGNOSIS — Z80.8 FAMILY HISTORY OF MALIGNANT NEOPLASM OF OTHER ORGANS OR SYSTEMS: ICD-10-CM

## 2021-07-20 DIAGNOSIS — E04.2 NONTOXIC MULTINODULAR GOITER: ICD-10-CM

## 2021-07-20 PROCEDURE — 99072 ADDL SUPL MATRL&STAF TM PHE: CPT

## 2021-07-20 PROCEDURE — 99204 OFFICE O/P NEW MOD 45 MIN: CPT

## 2021-07-20 NOTE — REASON FOR VISIT
[Initial Consultation] : an initial consultation for [FreeTextEntry2] : referred by Dr. Ivan Barton, endocrinologist for thyroid nodules.

## 2021-07-20 NOTE — PHYSICAL EXAM
[Nodule] : nodule [Enlarged] : enlarged [FreeTextEntry1] : multinodular and enlarged R greater than L thyroid  [Midline] : trachea located in midline position [Normal] : no rashes

## 2021-07-20 NOTE — CONSULT LETTER
[Dear  ___] : Dear  [unfilled], [Courtesy Letter:] : I had the pleasure of seeing your patient, [unfilled], in my office today. [Please see my note below.] : Please see my note below. [Sincerely,] : Sincerely, [FreeTextEntry2] : Ivan Barton MD ([place], [place]) [FreeTextEntry3] : Isaac Tran MD, FACS\par \par    [facility]\par Associate Chair\par    Department of Otolaryngology\par \par Professor\par Otolaryngology & Molecular Medicine\par [employer]\par

## 2021-07-20 NOTE — HISTORY OF PRESENT ILLNESS
[de-identified] : 56 year old female referred by Dr. Ivan Barton, endocrinologist for enlarged and multinodular thyroid and to discuss any need for surgical intervention, now or in future. \par FNA of thyroid 04/2021 1.5 cm L benign.  largest 4cm R from 2019 FNA benign.2013 isthmus FNA benign. \par States most recent labs were in 2020.\par Denies dysphagia, odynophagia, dyspnea, dysphonia, otalgia-occ pressure neck w raising arms. \par Denies history of smoking and alcohol use. \par Denies fevers, chills, night sweats, weight loss. \par Completed COVID vaccine ([employer]) 2nd dose 12/2020

## 2022-02-03 NOTE — ED ADULT NURSE NOTE - BREATHING, MLM
Guide catheter inserted over exchange length wire. Sheathless with 5FR MPA mother-daughter. Spontaneous, unlabored and symmetrical

## 2022-08-02 ENCOUNTER — APPOINTMENT (OUTPATIENT)
Dept: OTOLARYNGOLOGY | Facility: CLINIC | Age: 57
End: 2022-08-02

## 2022-11-22 NOTE — ED ADULT NURSE NOTE - CAS DISCH ACCOMP BY
Spoke with pt confirmed that nebulizer is broken beyond repair. encouraged pt to f/u with [facility] regarding oxygen, phone number given. Explained our office is working on documentation required for insurance coverage. Pt voiced understanding. Dr. Flaco Hamilton please addend your last office notes to include that pt's nebulizer is broken beyond repair. Self

## 2022-12-15 NOTE — DISCHARGE NOTE ADULT - MEDICATION SUMMARY - MEDICATIONS TO TAKE
----- Message from Zaina Mclean sent at 12/15/2022  9:56 AM CST -----  Regarding: RETURN CALL  PATIENT RECEIVED A CALL FROM THE OFFICE, AND WOULD LIKE A RETURN CALL FROM YOU, CALL THE PATIENT  [phone]     I will START or STAY ON the medications listed below when I get home from the hospital:    pantoprazole 40 mg oral delayed release tablet  -- 1 tab(s) by mouth once a day (before a meal)  -- Indication: For protect from indigestion , gastric ulcer

## 2023-05-04 ENCOUNTER — EMERGENCY (EMERGENCY)
Facility: HOSPITAL | Age: 58
LOS: 1 days | Discharge: HOME | End: 2023-05-04
Admitting: STUDENT IN AN ORGANIZED HEALTH CARE EDUCATION/TRAINING PROGRAM
Payer: COMMERCIAL

## 2023-05-04 VITALS
SYSTOLIC BLOOD PRESSURE: 115 MMHG | RESPIRATION RATE: 16 BRPM | TEMPERATURE: 99 F | OXYGEN SATURATION: 100 % | DIASTOLIC BLOOD PRESSURE: 83 MMHG | HEART RATE: 87 BPM

## 2023-05-04 VITALS
RESPIRATION RATE: 18 BRPM | DIASTOLIC BLOOD PRESSURE: 71 MMHG | HEART RATE: 62 BPM | TEMPERATURE: 99 F | OXYGEN SATURATION: 100 % | SYSTOLIC BLOOD PRESSURE: 119 MMHG

## 2023-05-04 PROCEDURE — 73562 X-RAY EXAM OF KNEE 3: CPT | Mod: 26,LT

## 2023-05-04 PROCEDURE — 26700 TREAT KNUCKLE DISLOCATION: CPT | Mod: 54

## 2023-05-04 PROCEDURE — 99284 EMERGENCY DEPT VISIT MOD MDM: CPT | Mod: 57

## 2023-05-04 PROCEDURE — 73130 X-RAY EXAM OF HAND: CPT | Mod: 26,50,76

## 2023-05-04 RX ORDER — ACETAMINOPHEN 500 MG
650 TABLET ORAL ONCE
Refills: 0 | Status: COMPLETED | OUTPATIENT
Start: 2023-05-04 | End: 2023-05-04

## 2023-05-04 RX ORDER — TETANUS TOXOID, REDUCED DIPHTHERIA TOXOID AND ACELLULAR PERTUSSIS VACCINE, ADSORBED 5; 2.5; 8; 8; 2.5 [IU]/.5ML; [IU]/.5ML; UG/.5ML; UG/.5ML; UG/.5ML
0.5 SUSPENSION INTRAMUSCULAR ONCE
Refills: 0 | Status: COMPLETED | OUTPATIENT
Start: 2023-05-04 | End: 2023-05-04

## 2023-05-04 RX ADMIN — TETANUS TOXOID, REDUCED DIPHTHERIA TOXOID AND ACELLULAR PERTUSSIS VACCINE, ADSORBED 0.5 MILLILITER(S): 5; 2.5; 8; 8; 2.5 SUSPENSION INTRAMUSCULAR at 20:22

## 2023-05-04 RX ADMIN — Medication 650 MILLIGRAM(S): at 20:19

## 2023-05-04 NOTE — ED PROVIDER NOTE - MUSCULOSKELETAL, MLM
hand: multiple abrasions to bilateral hands.  Right thumb and hyperextended position and laterally rotatedSpine appears normal, range of motion is not limited, no muscle or joint tenderness

## 2023-05-04 NOTE — ED PROVIDER NOTE - OBJECTIVE STATEMENT
Social 2 runsa 58-year-old female status post mechanical fall presenting with left knee and right hand pain.  Patient states notable deformity to right thumb status post fall.  She denies head trauma or any other significant injuries.

## 2023-05-04 NOTE — ED PROVIDER NOTE - PATIENT PORTAL LINK FT
You can access the FollowMyHealth Patient Portal offered by [facility] by registering at the following website: [email or web address]. By joining [employer]’s FollowMyHealth portal, you will also be able to view your health information using other applications (apps) compatible with our system.

## 2023-05-04 NOTE — ED PROVIDER NOTE - GASTROINTESTINAL NEGATIVE STATEMENT, MLM
no abdominal pain, no bloating, no constipation, no diarrhea, no nausea and no vomiting. [employer] Preamble Text (Leave Blank If You Do Not Want): Undermining was performed with blunt dissection.

## 2023-05-04 NOTE — ED PROVIDER NOTE - NSFOLLOWUPINSTRUCTIONS_ED_ALL_ED_FT
Finger or Thumb Dislocation  Finger or thumb dislocation happens when the bones in a joint move out of their normal positions. Dislocations may occur in any joint in the fingers or thumb. Finger or thumb dislocation is a common and serious injury.    What are the causes?  This condition is caused by a forceful impact or injury to the hand or when the finger or thumb bends the wrong way (hyperextension).    What increases the risk?  You are more likely to develop this condition if you:  Have previously injured your hand.  Do repetitive motions with your hands, such as when playing sports or doing heavy labor.  Have poor hand strength and flexibility.  What are the signs or symptoms?  Symptoms of this condition may include:  Deformity of the injured area. The affected joint may look like it is out of place or at an odd angle.  Pain, swelling, and bruising in the injured area.  Limited range of motion of the finger or thumb.  How is this diagnosed?  This condition is diagnosed with a physical exam. You may also have X-rays to check for breaks (fractures) in your bones.    How is this treated?  For mild dislocations, this condition is treated by moving your finger or thumb back into position (reduction). Your health care provider may do this by hand (manually) or with surgery. You may need surgical reduction if you have:  A severe dislocation.  A dislocation that cannot be reduced manually.  A fractured bone.  An open wound.  After reduction, your finger or thumb may be kept in a fixed position (immobilized) with a splint for up to 6 weeks. You may also need physical therapy. In some cases, you may need to go to a health care provider who specializes in bone disorders (orthopedist) to help treat your condition.    Follow these instructions at home:  If you have a splint:    Do not put pressure on any part of the splint until it is fully hardened. This may take several hours.  Wear the splint as told by your health care provider. Remove it only as told by your health care provider.  Loosen the splint if your fingers tingle, become numb, or turn cold and blue.  Keep the splint clean.  If the splint is not waterproof:  Do not let it get wet.  Cover it with a watertight covering when you take a bath or shower.  Managing pain, stiffness, and swelling    A bag of ice on a towel on the skin.  If directed, put ice on the injured area.  If you have a removable splint, remove it as told by your health care provider.  Put ice in a plastic bag.  Place a towel between your skin and the bag.  Leave the ice on for 20 minutes, 2–3 times a day.  Move your fingers often to reduce stiffness and swelling.  Raise (elevate) the injured area above the level of your heart while you are sitting or lying down.  Activity    Return to your normal activities as told by your health care provider. Ask your health care provider what activities are safe for you.  Rest and limit your hand movement as told by your health care provider.  If physical therapy was prescribed, do exercises as told by your health care provider.  Driving    Ask your health care provider if the medicine prescribed to you requires you to avoid driving or using heavy machinery.  Ask your health care provider when it is safe to drive if you have a splint on your hand.  General instructions    Take over-the-counter and prescription medicines only as told by your health care provider.  Do not take baths, swim, or use a hot tub until your health care provider approves. Ask your health care provider if you may take showers. You may only be allowed to take sponge baths.  Do not use any products that contain nicotine or tobacco, such as cigarettes, e-cigarettes, and chewing tobacco. These can delay bone healing. If you need help quitting, ask your health care provider.  Keep all follow-up visits as told by your health care provider. This is important.  Contact a health care provider if you have:  Problems with your splint.  Pain that gets worse or does not get better with medicine.  More bruising, swelling, or redness in your injured area.  Difficulty moving your finger or thumb after it heals.  Get help right away if:  You develop numbness in your finger or thumb.  You cannot move your finger or thumb.  Your finger or thumb is pale or cold.  You have severe pain.  Summary  Finger or thumb dislocation happens when the bones in a joint move out of their normal positions.  This condition is caused by a forceful impact or injury to the hand.  For mild dislocations, this condition is treated by moving your finger or thumb back into position (reduction).  You may need surgery if you have a severe dislocation, an open wound, or a fractured bone.  This information is not intended to replace advice given to you by your health care provider. Make sure you discuss any questions you have with your health care provider.

## 2023-05-04 NOTE — ED ADULT TRIAGE NOTE - CHIEF COMPLAINT QUOTE
pt reports trip and fall while walking sustaining injury to right thumb. right thumb with deformity and bleeding. thumb dressed with guaze at this time. abrasion noted to left knee, hand and cheek. pt denies head trauma, LOC, blood thinner use. PMH HTN.

## 2023-05-04 NOTE — ED PROVIDER NOTE - CLINICAL SUMMARY MEDICAL DECISION MAKING FREE TEXT BOX
58-year-old female status post mechanical fall sustaining thumb injury, with notable deformity.  Symptoms concerning for possible fracture versus dislocation.  Will obtain x-rays will review imaging and dispo appropriately

## 2023-05-04 NOTE — ED PROVIDER NOTE - ADDITIONAL NOTES AND INSTRUCTIONS:
Please excuse the absence of Ms. Jovel. She was evaluated in the emergency room for right hand injury. She may return to work on 5/13/2023 without limitations or restrictions.

## 2023-05-04 NOTE — ED PROVIDER NOTE - CARE PROVIDER_API CALL
Nisa Saucedo (MD; MPH)  Orthopaedic Surgery  [address]  [place] [address]  Phone: [phone]  Fax: [phone]  Follow Up Time: 4-6 Days

## 2023-05-09 ENCOUNTER — APPOINTMENT (OUTPATIENT)
Dept: ORTHOPEDIC SURGERY | Facility: CLINIC | Age: 58
End: 2023-05-09

## 2023-05-11 ENCOUNTER — APPOINTMENT (OUTPATIENT)
Dept: ORTHOPEDIC SURGERY | Facility: CLINIC | Age: 58
End: 2023-05-11
Payer: COMMERCIAL

## 2023-05-11 ENCOUNTER — NON-APPOINTMENT (OUTPATIENT)
Age: 58
End: 2023-05-11

## 2023-05-11 VITALS — HEIGHT: 60 IN | BODY MASS INDEX: 24.74 KG/M2 | WEIGHT: 126 LBS

## 2023-05-11 PROCEDURE — 99203 OFFICE O/P NEW LOW 30 MIN: CPT

## 2023-05-11 PROCEDURE — 73130 X-RAY EXAM OF HAND: CPT | Mod: RT

## 2023-05-11 NOTE — HISTORY OF PRESENT ILLNESS
[de-identified] : Pt is a 59 y/o female with right thumb dislocation.  She tripped and fell on the street on 5/4/23.  She dislocated the right thumb.  She went to [facility] ED where xrays were taken.  The thumb was reduced.  Xray were negative for fracture.

## 2023-05-11 NOTE — END OF VISIT
[FreeTextEntry3] : All medical record entries made by the Scribe were at my,  Dr. Eduardo Forrest MD., direction and personally dictated by me on 05/11/2023. I have personally reviewed the chart and agree that the record accurately reflects my personal performance of the history, physical exam, assessment and plan.

## 2023-05-11 NOTE — ADDENDUM
[FreeTextEntry1] : I, Peri Cohen wrote this note acting as a scribe for Dr. Eduardo Forrest on May 11, 2023.

## 2023-05-11 NOTE — DISCUSSION/SUMMARY
[de-identified] : The underlying pathophysiology was reviewed with the patient. XR films were reviewed with the patient. Discussed at length the nature of the patient’s condition. The right thumb symptoms are secondary to MCP dislocation.\par \par At this time, she was instructed on local wound care given the small wound she sustained as a result of the injury. I recommended gentle ROM exercises of the thumb and digits as well as use of the hand for all ADLs as tolerated. At her request, she was provided with a referral to hand therapy.\par The patient wishes to proceed with hand therapy of the right hand (ROM and strengthening). A script was given.\par \par All questions answered, understanding verbalized. Patient in agreement with plan of care. Follow up in 2 weeks for repeat xrays.

## 2023-05-11 NOTE — PHYSICAL EXAM
[de-identified] : Patient is WDWN, alert, and in no acute distress. Breathing is unlabored. She is grossly oriented to person, place, and time.\par \par Right Hand (Thumb):\par There is a small laceration/wound noted to the first webspace.\par Edema and mild ecchymosis noted to the right thumb.\par RCL and UCL of the right thumb MCP joint are stable to stress testing. \par ROM at the IP joint of the thumb is full.\par Limitations of motion at the MP joint given injury.\par She cannot oppose the thumb to the other digits.\par Digital motion is full otherwise.\par Sensation is intact to the digits distally. [de-identified] : AP, lateral and oblique views of the RIGHT hand were obtained today and revealed the right thumb MCP joint to be in a reduced position.\par \par ------------------------------------------------------------------------------------------------------------------------------------------------------------------------------------ \par \par EXAM: XR HAND MIN 3 VIEWS RT\par PROCEDURE DATE: 05/04/2023\par IMPRESSION:\par Interval reduction of the first proximal phalanx dislocation. Redemonstrated avulsion fracture of the adjacent sesamoid.\par \par LESLY CORREA MD; Resident Radiologist\par This document has been electronically signed.\par BLANK VANEGAS MD; Attending Radiologist\par This document has been electronically signed. May 5 2023 8:40AM\par \par ------------------------------------------------------------------------------------------------------------------------------------------------------------------------------------\par \par EXAM: XR HAND MIN 3 VIEWS BI ORDERED\par PROCEDURE DATE: 05/04/2023\par IMPRESSION:\par Acute dislocation of the right thumb proximal phalanx with radial displacement. Adjacent displaced avulsion fracture of the sesamoid bone at the palmar aspect of the first metacarpal head.\par \par The post reduction views demonstrate reduction of this joint with the sesamoid fracture once again visualized.\par \par LESLY CORREA MD; Resident Radiologist\par This document has been electronically signed.\par BLANK VANEGAS MD; Attending Radiologist\par This document has been electronically signed. May 4 2023 11:14PM

## 2023-06-22 ENCOUNTER — APPOINTMENT (OUTPATIENT)
Dept: CARDIOLOGY | Facility: CLINIC | Age: 58
End: 2023-06-22

## 2023-06-22 ENCOUNTER — APPOINTMENT (OUTPATIENT)
Dept: ORTHOPEDIC SURGERY | Facility: CLINIC | Age: 58
End: 2023-06-22
Payer: COMMERCIAL

## 2023-06-22 VITALS — WEIGHT: 136 LBS | BODY MASS INDEX: 26.7 KG/M2 | HEIGHT: 60 IN

## 2023-06-22 DIAGNOSIS — S63.104A UNSPECIFIED DISLOCATION OF RIGHT THUMB, INITIAL ENCOUNTER: ICD-10-CM

## 2023-06-22 PROCEDURE — 73130 X-RAY EXAM OF HAND: CPT | Mod: RT

## 2023-06-22 PROCEDURE — 99213 OFFICE O/P EST LOW 20 MIN: CPT

## 2023-06-22 NOTE — END OF VISIT
[FreeTextEntry3] : All medical record entries made by the Scribe were at my,  Dr. Eduardo Forrest MD., direction and personally dictated by me on 06/22/2023. I have personally reviewed the chart and agree that the record accurately reflects my personal performance of the history, physical exam, assessment and plan.

## 2023-06-22 NOTE — ADDENDUM
[FreeTextEntry1] : I, Peri Cohen wrote this note acting as a scribe for Dr. Eduardo Forrest on Jun 22, 2023.

## 2023-06-22 NOTE — HISTORY OF PRESENT ILLNESS
[de-identified] : Pt is a 59 y/o female with right thumb dislocation.  She tripped and fell on the street on 5/4/23.  She dislocated the right thumb.  She went to [facility] ED where xrays were taken.  The thumb was reduced.  Xray were negative for fracture.  She was initially seen in the office on 5/11/22 at which time she was instructed on local wound care, a home exercise program and was referred for hand therapy. She returns on 6/22/23 and is progressing well. She reports to be improving with regard to pain. She does however note residual weakness to the hand. She is currently in occupational therapy.

## 2023-06-22 NOTE — PHYSICAL EXAM
[de-identified] : Patient is WDWN, alert, and in no acute distress. Breathing is unlabored. She is grossly oriented to person, place, and time.\par \par Right Hand (Thumb):\par There is a healed laceration/wound noted to the first webspace.\par Improving edema and mild ecchymosis noted to the right thumb.\par RCL and UCL of the right thumb MCP joint are stable to stress testing. \par ROM at the IP joint of the thumb is full.\par Improved motion at the MP joint.\par She can oppose the thumb to the other digits.\par Digital motion is full otherwise.\par Sensation is intact to the digits distally. [de-identified] : AP, lateral and oblique views of the RIGHT hand were obtained today and revealed the right thumb MCP joint to be in a reduced position.\par \par ------------------------------------------------------------------------------------------------------------------------------------------------------------------------------------ \par \par EXAM: XR HAND MIN 3 VIEWS RT\par PROCEDURE DATE: 05/04/2023\par IMPRESSION:\par Interval reduction of the first proximal phalanx dislocation. Redemonstrated avulsion fracture of the adjacent sesamoid.\par \par LESLY CORREA MD; Resident Radiologist\par This document has been electronically signed.\par BLANK VANEGAS MD; Attending Radiologist\par This document has been electronically signed. May 5 2023 8:40AM\par \par ------------------------------------------------------------------------------------------------------------------------------------------------------------------------------------\par \par EXAM: XR HAND MIN 3 VIEWS BI ORDERED\par PROCEDURE DATE: 05/04/2023\par IMPRESSION:\par Acute dislocation of the right thumb proximal phalanx with radial displacement. Adjacent displaced avulsion fracture of the sesamoid bone at the palmar aspect of the first metacarpal head.\par \par The post reduction views demonstrate reduction of this joint with the sesamoid fracture once again visualized.\par \par LESLY CORREA MD; Resident Radiologist\par This document has been electronically signed.\par BLANK VANEGAS MD; Attending Radiologist\par This document has been electronically signed. May 4 2023 11:14PM

## 2023-06-22 NOTE — DISCUSSION/SUMMARY
[de-identified] : The underlying pathophysiology was reviewed with the patient. XR films were reviewed with the patient. Discussed at length the nature of the patient’s condition. The right thumb symptoms are secondary to MCP dislocation.\par \par At this time, she was instructed to continue with a home exercise program as well as hand therapy. I told her that she can continue with hand therapy as long as she deems necessary and once she feels she reaches maximum improvement, she may discontinue going. \par \par All questions answered, understanding verbalized. Patient in agreement with plan of care. Follow up as needed.

## 2023-07-20 NOTE — DISCHARGE NOTE ADULT - DO YOU HAVE DIFFICULTY CLIMBING STAIRS
General Call    Contacts         Type Contact Phone/Fax    07/20/2023 09:58 AM CDT Phone (Incoming) Siddharth Fontanez (Self) [phone] (M)          Reason for Call:  surg consult    What are your questions or concerns:  pt states she is ready to schedule consult with surgeon and would like to get that scheduled asap         Could we send this information to you in [facility] or would you prefer to receive a phone call?:   Patient would prefer a phone call   Okay to leave a detailed message?: Yes at Cell number on file:    Telephone Information:   Mobile [phone]       No

## 2023-08-17 ENCOUNTER — APPOINTMENT (OUTPATIENT)
Dept: NEPHROLOGY | Facility: CLINIC | Age: 58
End: 2023-08-17

## 2023-09-12 ENCOUNTER — APPOINTMENT (OUTPATIENT)
Dept: CARDIOLOGY | Facility: CLINIC | Age: 58
End: 2023-09-12
Payer: COMMERCIAL

## 2023-09-12 VITALS
WEIGHT: 131 LBS | BODY MASS INDEX: 25.72 KG/M2 | OXYGEN SATURATION: 98 % | HEIGHT: 60 IN | HEART RATE: 52 BPM | SYSTOLIC BLOOD PRESSURE: 126 MMHG | DIASTOLIC BLOOD PRESSURE: 85 MMHG

## 2023-09-12 DIAGNOSIS — I10 ESSENTIAL (PRIMARY) HYPERTENSION: ICD-10-CM

## 2023-09-12 PROCEDURE — 99213 OFFICE O/P EST LOW 20 MIN: CPT

## 2023-09-12 PROCEDURE — 93000 ELECTROCARDIOGRAM COMPLETE: CPT

## 2023-09-12 RX ORDER — ROSUVASTATIN CALCIUM 5 MG/1
5 TABLET, FILM COATED ORAL DAILY
Qty: 90 | Refills: 1 | Status: ACTIVE | COMMUNITY
Start: 2023-09-12

## 2023-11-19 PROBLEM — I10 HYPERTENSION: Status: ACTIVE | Noted: 2017-12-21

## 2024-03-18 NOTE — H&P ADULT - PROBLEM/PLAN-1
Closed fracture of proximal end of right tibia - PROG NOTE 3.13.24 - XR 3.14.24 - NO KNOWN SX    DISPLAY PLAN FREE TEXT

## 2024-09-17 ENCOUNTER — APPOINTMENT (OUTPATIENT)
Dept: CARDIOLOGY | Facility: CLINIC | Age: 59
End: 2024-09-17